# Patient Record
Sex: MALE | Race: WHITE | NOT HISPANIC OR LATINO
[De-identification: names, ages, dates, MRNs, and addresses within clinical notes are randomized per-mention and may not be internally consistent; named-entity substitution may affect disease eponyms.]

---

## 2018-03-08 ENCOUNTER — RESULT REVIEW (OUTPATIENT)
Age: 68
End: 2018-03-08

## 2018-11-16 ENCOUNTER — APPOINTMENT (OUTPATIENT)
Dept: ORTHOPEDIC SURGERY | Facility: CLINIC | Age: 68
End: 2018-11-16
Payer: MEDICARE

## 2018-11-16 VITALS
WEIGHT: 180 LBS | HEIGHT: 71 IN | BODY MASS INDEX: 25.2 KG/M2 | HEART RATE: 61 BPM | SYSTOLIC BLOOD PRESSURE: 111 MMHG | DIASTOLIC BLOOD PRESSURE: 68 MMHG

## 2018-11-16 PROCEDURE — 73110 X-RAY EXAM OF WRIST: CPT | Mod: RT

## 2018-11-16 PROCEDURE — 99203 OFFICE O/P NEW LOW 30 MIN: CPT

## 2018-12-13 ENCOUNTER — APPOINTMENT (OUTPATIENT)
Dept: ORTHOPEDIC SURGERY | Facility: CLINIC | Age: 68
End: 2018-12-13
Payer: MEDICARE

## 2018-12-13 DIAGNOSIS — M19.041 PRIMARY OSTEOARTHRITIS, RIGHT HAND: ICD-10-CM

## 2018-12-13 PROCEDURE — 99213 OFFICE O/P EST LOW 20 MIN: CPT

## 2020-08-20 PROBLEM — M19.041 PRIMARY LOCALIZED OSTEOARTHROSIS OF RIGHT HAND: Status: ACTIVE | Noted: 2018-11-16

## 2023-05-12 ENCOUNTER — NON-APPOINTMENT (OUTPATIENT)
Age: 73
End: 2023-05-12

## 2023-05-12 ENCOUNTER — APPOINTMENT (OUTPATIENT)
Dept: NEUROLOGY | Facility: CLINIC | Age: 73
End: 2023-05-12
Payer: MEDICARE

## 2023-05-12 VITALS
BODY MASS INDEX: 25.2 KG/M2 | WEIGHT: 180 LBS | SYSTOLIC BLOOD PRESSURE: 111 MMHG | HEART RATE: 62 BPM | HEIGHT: 71 IN | DIASTOLIC BLOOD PRESSURE: 73 MMHG

## 2023-05-12 DIAGNOSIS — G20 PARKINSON'S DISEASE: ICD-10-CM

## 2023-05-12 PROCEDURE — 99204 OFFICE O/P NEW MOD 45 MIN: CPT

## 2023-05-12 NOTE — DISCUSSION/SUMMARY
[FreeTextEntry1] : Patient with PD since 1996 who is stable on current LD regimen. He does not have motor fluctuations and dyskinesia are mild and not bothersome. \par \par Patient was counseled on the following recommendations:\par \par Will leave his current regimen unchanged.\par He should continue to remain active \par \par f/u 4-6 months

## 2023-05-12 NOTE — HISTORY OF PRESENT ILLNESS
[FreeTextEntry1] : 72 year RH male patient was diagnosed with Parkinson's disease in 1996. FIrst started as a right side tremor. Dr Cori Harrison was his most recent neurologist.  Denies issues with gait, no aid, no falls. Recalls starting carbidopa-levodopa 25/100 about 8 years ago. He notices it helps his tremor. He reports changes to his speech more garbled in the last few months. +dyskinetic neck movements in the last year. Unsure if he obtained genetic testing. Unaware of LD Kinetics. He is independent with ADLs, occasionally needs help with a button. \par \par Goes to the gym occasionally. No PT/OT\par \par Nonmotor:\par -Constipation, feels like he is not emptying bowels \par -Sleep is good no RBD \par -Denies difficulty swallowing\par -Denies cognitive changes\par -Denies VH \par -Mood improved since starting mirtazapine \par \par Medical hx: Prostate Ca on hormone therapy, HTN, HLD\par Family history: Younger sister has PD, mom had PD with hallucinations\par \par Current Medications:\par rasagaline 0.5mg qd\par mirtazapine 30mg qd\par carbidopa-levodopa 25/100 2-2-1 about q5h\par \par Prior meds:\par neupro\par amantadine- caused bad dreams\par

## 2023-05-12 NOTE — PHYSICAL EXAM
[Cranial Nerves Oculomotor (III)] : extraocular motion intact [Cranial Nerves Facial (VII)] : face symmetrical [Motor Strength] : muscle strength was normal in all four extremities [Sensation Tactile Decrease] : light touch was intact [1+] : Patella left 1+ [FreeTextEntry1] : There are mild dyskinesia of the head and neck. EOMI. Speech is 2+ and intelligble. There is no mild distal>prox bradykinesia with 1+ rigidity in his limbs. Walks with good SL and turns. Mild inversion of feet with each stride. Pull test negative. No FoG

## 2023-06-21 RX ORDER — MIRTAZAPINE 30 MG/1
30 TABLET, FILM COATED ORAL
Qty: 90 | Refills: 3 | Status: ACTIVE | COMMUNITY
Start: 2023-05-12 | End: 1900-01-01

## 2023-09-08 ENCOUNTER — APPOINTMENT (OUTPATIENT)
Dept: NEUROLOGY | Facility: CLINIC | Age: 73
End: 2023-09-08
Payer: MEDICARE

## 2023-09-08 PROCEDURE — 99214 OFFICE O/P EST MOD 30 MIN: CPT

## 2023-09-08 NOTE — HISTORY OF PRESENT ILLNESS
[FreeTextEntry1] : 72 year RH male patient was diagnosed with Parkinson's disease in 1996. FIrst started as a right side tremor. Dr Cori Harrison was his most recent neurologist.  Denies issues with gait, no aid, no falls. Recalls starting carbidopa-levodopa 25/100 about 8 years ago. He notices it helps his tremor. He reports changes to his speech more garbled in the last few months. +dyskinetic neck movements in the last year. Unsure if he obtained genetic testing. Unaware of LD Kinetics. He is independent with ADLs, occasionally needs help with a button.   Interim hx He reports some reduction in his speech with tendency to run out of breath. In the morning, his speech is close to normal.  He does not having wearing off episodes Denies any fall; wife says that patient is off balance when he has to turn or walk backwards He is independent to all ADLs Does not exercise or do PT Wife feels that pt is more carballo, which he denies. Does not endorse MDD or anxiety   Nonmotor: + constipation BMs every few days. He has tried a number of stool softners.  -Sleep is good no RBD  -Denies difficulty swallowing -Denies cognitive changes -Denies VH  -Mood improved since starting mirtazapine   Medical hx: Prostate Ca on hormone therapy, HTN, HLD Family history: Younger sister has PD, mom had PD with hallucinations  Current Medications: rasagaline 0.5mg qd mirtazapine 30mg qd carbidopa-levodopa 25/100 2-2-1 about q5h  Prior meds: neupro amantadine- caused bad dreams

## 2023-09-08 NOTE — PHYSICAL EXAM
[FreeTextEntry1] : There are mild dyskinesia of the head and neck. EOMI. Speech is 2+ and intelligble. There is no mild distal>prox bradykinesia with 1+ rigidity in his limbs. Walks with good SL and turns. Mild inversion of feet with each stride. Pull test negative. No FoG.  
none

## 2023-09-08 NOTE — DISCUSSION/SUMMARY
[FreeTextEntry1] : Patient with PD since 1996 who is stable on current LD regimen. He does not have motor fluctuations and dyskinesia are mild and not bothersome. Hypophonia has increased  Patient was counseled on the following recommendations: Will leave his current regimen unchanged. refer to ENT for evaluation of VCs Prefers to defer SLP at that this.  f/u GI doc regarding constipation management Increase exercising   f/u 4-6 months.

## 2023-12-13 ENCOUNTER — NON-APPOINTMENT (OUTPATIENT)
Age: 73
End: 2023-12-13

## 2024-01-02 ENCOUNTER — APPOINTMENT (OUTPATIENT)
Dept: NEUROLOGY | Facility: CLINIC | Age: 74
End: 2024-01-02
Payer: MEDICARE

## 2024-01-02 VITALS
SYSTOLIC BLOOD PRESSURE: 103 MMHG | BODY MASS INDEX: 27.72 KG/M2 | DIASTOLIC BLOOD PRESSURE: 68 MMHG | HEIGHT: 71 IN | WEIGHT: 198 LBS | HEART RATE: 73 BPM

## 2024-01-02 PROCEDURE — 99214 OFFICE O/P EST MOD 30 MIN: CPT

## 2024-01-04 NOTE — DISCUSSION/SUMMARY
[FreeTextEntry1] : Patient with PD since 1996 who is stable on current LD regimen. He does not have motor fluctuations and dyskinesia are mild and not bothersome  Patient was counseled on the following recommendations: leave PD regimen the same increase exercising maintain bowel regimen  f/u 4months

## 2024-01-04 NOTE — PHYSICAL EXAM
[FreeTextEntry1] : There are mild dyskinesia of the head and neck. EOMI. Speech is 2+ and intelligible. There is no mild distal>prox bradykinesia with 1+ rigidity in his limbs. Walks with good SL and turns. Mild inversion of feet with each stride. Pull test negative. No FoG

## 2024-01-04 NOTE — HISTORY OF PRESENT ILLNESS
[FreeTextEntry1] : 72 year RH male patient was diagnosed with Parkinson's disease in 1996. FIrst started as a right side tremor. Dr Cori Harrison was his most recent neurologist.  Denies issues with gait, no aid, no falls. Recalls starting carbidopa-levodopa 25/100 about 8 years ago. He notices it helps his tremor. He reports changes to his speech more garbled in the last few months. +dyskinetic neck movements in the last year. Unsure if he obtained genetic testing. Unaware of LD Kinetics. He is independent with ADLs, occasionally needs help with a button.   Interim hx He has been stable since his last visit does not have fluctuations.  Wife says that hypophonia worsens as the day progresses does not exercise much Denies any falls He is independent to all ADLs   Nonmotor: + constipation BMs every few days. He has tried a number of stool softners and not currently on anything -Sleep is good no RBD  -Denies difficulty swallowing -Denies cognitive changes -Denies VH  -Mood improved since starting mirtazapine   Medical hx: Prostate Ca on hormone therapy, HTN, HLD Family history: Younger sister has PD, mom had PD with hallucinations  Current Medications: rasagaline 0.5mg qd mirtazapine 30mg qhs carbidopa-levodopa 25/100 2-2-1 about q5h  Prior meds: neupro amantadine- caused bad dreams

## 2024-05-06 ENCOUNTER — APPOINTMENT (OUTPATIENT)
Dept: ORTHOPEDIC SURGERY | Facility: CLINIC | Age: 74
End: 2024-05-06
Payer: MEDICARE

## 2024-05-06 VITALS — WEIGHT: 198 LBS | HEIGHT: 71 IN | BODY MASS INDEX: 27.72 KG/M2

## 2024-05-06 DIAGNOSIS — M48.062 SPINAL STENOSIS, LUMBAR REGION WITH NEUROGENIC CLAUDICATION: ICD-10-CM

## 2024-05-06 PROCEDURE — 72170 X-RAY EXAM OF PELVIS: CPT

## 2024-05-06 PROCEDURE — 72110 X-RAY EXAM L-2 SPINE 4/>VWS: CPT

## 2024-05-06 PROCEDURE — 99204 OFFICE O/P NEW MOD 45 MIN: CPT

## 2024-05-06 NOTE — HISTORY OF PRESENT ILLNESS
[Gradual] : gradual [8] : 8 [0] : 0 [Dull/Aching] : dull/aching [Constant] : constant [de-identified] : 5/6/24:  74 yo M here for lower back pain - pain with activity - cant walk far   No prior spinal surgery No PT/chiro/accupucnture no use of cane brace at times   parkinson's/HTN prostate cancer -being tx with hormone tx now - had surgery and radiation as well  has bb incontinonce at times - not wearing diaper  retired   xrays today: L spine - diffuse spondylosis  AP PELVIS - HIP NARROWING  [] : no [FreeTextEntry5] : 05/06/2024 Mr. ANILA ARREODNDO is a 73 year M  presents today for evaluation of lower back pt states  he get pain when he stand or walk

## 2024-05-06 NOTE — DISCUSSION/SUMMARY
[Medication Risks Reviewed] : Medication risks reviewed [de-identified] : reviewed the case and hte imaging symptoms c/w stenosis indicated for mri l SPINE  PT  fu to review tHE mri

## 2024-05-07 ENCOUNTER — APPOINTMENT (OUTPATIENT)
Dept: MRI IMAGING | Facility: CLINIC | Age: 74
End: 2024-05-07
Payer: MEDICARE

## 2024-05-07 PROCEDURE — 72148 MRI LUMBAR SPINE W/O DYE: CPT | Mod: MH

## 2024-05-10 ENCOUNTER — APPOINTMENT (OUTPATIENT)
Dept: ORTHOPEDIC SURGERY | Facility: CLINIC | Age: 74
End: 2024-05-10
Payer: MEDICARE

## 2024-05-10 VITALS — HEIGHT: 71 IN | WEIGHT: 198 LBS | BODY MASS INDEX: 27.72 KG/M2

## 2024-05-10 PROCEDURE — 99214 OFFICE O/P EST MOD 30 MIN: CPT

## 2024-05-11 NOTE — HISTORY OF PRESENT ILLNESS
[Gradual] : gradual [8] : 8 [0] : 0 [Dull/Aching] : dull/aching [Constant] : constant [de-identified] : 5/6/24:  72 yo M here for lower back pain - pain with activity - cant walk far   No prior spinal surgery No PT/chiro/accupucnture no use of cane brace at times   parkinson's/HTN prostate cancer -being tx with hormone tx now - had surgery and radiation as well  has bb incontinonce at times - not wearing diaper  retired   xrays today: L spine - diffuse spondylosis  AP PELVIS - HIP NARROWING   05/10/2024: Here for follow up/ MRI review.  Plan at last was "reviewed the case and hte imaging symptoms c/w stenosis indicated for mri l SPINE  PT  fu to review tHE mri" - overall symptoms remains in the back/leg here to review tHE MRI   NO pt YET ACTIVITIES REMAIN LIMITED   Lspine MRI:  Prominent dextroscoliosis with rotary component and mildly accentuated sagittal lordosis.  Multilevel spondylosis and facet arthrosis. L5-S1 chronic degeneration and mild foraminal narrowing L4-5 central right central disc herniation. Marked right facet hypertrophy, moderate to severe left facet hypertrophy with ligamentous buckling and joint effusions. Moderate spinal and bilateral foraminal stenosis. L3-4 severe chronic spondylosis. Moderate left foraminal stenosis L2-3 severe chronic disc degeneration L1-2 Left central disc herniation   [] : no [FreeTextEntry5] : MRI lower back results today. continuing PT.

## 2024-05-11 NOTE — DISCUSSION/SUMMARY
[Medication Risks Reviewed] : Medication risks reviewed [Surgical risks reviewed] : Surgical risks reviewed [de-identified] : reviewed the case and the imaging with the patient discussion of tx optoins  referral to pain for LESI AS NEXT step  consider surgery if not responding would be lami/fusion -we discussed this today

## 2024-05-22 ENCOUNTER — RX RENEWAL (OUTPATIENT)
Age: 74
End: 2024-05-22

## 2024-05-22 RX ORDER — CARBIDOPA AND LEVODOPA 25; 100 MG/1; MG/1
25-100 TABLET ORAL
Qty: 450 | Refills: 1 | Status: ACTIVE | COMMUNITY
Start: 2023-05-12 | End: 1900-01-01

## 2024-05-22 RX ORDER — RASAGILINE 0.5 MG/1
0.5 TABLET ORAL DAILY
Qty: 90 | Refills: 1 | Status: ACTIVE | COMMUNITY
Start: 2023-05-12 | End: 1900-01-01

## 2024-05-24 ENCOUNTER — APPOINTMENT (OUTPATIENT)
Dept: NEUROLOGY | Facility: CLINIC | Age: 74
End: 2024-05-24
Payer: MEDICARE

## 2024-05-24 VITALS
WEIGHT: 200 LBS | SYSTOLIC BLOOD PRESSURE: 124 MMHG | DIASTOLIC BLOOD PRESSURE: 88 MMHG | HEIGHT: 71 IN | HEART RATE: 65 BPM | BODY MASS INDEX: 28 KG/M2

## 2024-05-24 PROCEDURE — 99213 OFFICE O/P EST LOW 20 MIN: CPT

## 2024-05-24 NOTE — HISTORY OF PRESENT ILLNESS
[FreeTextEntry1] : 72 year RH male patient was diagnosed with Parkinson's disease in 1996. FIrst started as a right side tremor. Dr Cori Harrison was his most recent neurologist.  Denies issues with gait, no aid, no falls. Recalls starting carbidopa-levodopa 25/100 about 8 years ago. He notices it helps his tremor. He reports changes to his speech more garbled in the last few months. +dyskinetic neck movements in the last year. Unsure if he obtained genetic testing. Unaware of LD Kinetics. He is independent with ADLs, occasionally needs help with a button.   Interim hx He has been stable since his last visit. Denies any wearing offs.  Denies any falls Does all ADLs independently.  Does not exercise regularly. Seeing ortho who will be giving him an SULEMAN soon for his LBP His gait is limited by the pain in his lumbar region. Recent MRI L/spine showed Mod stenosis at L3-5 with DJD   Nonmotor: + Has BM daily. Plans to see a new GI specialist -Sleep is good no RBD  -Denies difficulty swallowing -Denies cognitive changes -Denies VH  -Mood improved since starting mirtazapine   Medical hx: Prostate Ca on hormone therapy, HTN, HLD Family history: Younger sister has PD, mom had PD with hallucinations  Current Medications: rasagaline 0.5mg qd mirtazapine 30mg qhs carbidopa-levodopa 25/100 2-2-1 about q5h  Prior meds: neupro amantadine- caused bad dreams

## 2024-05-24 NOTE — DISCUSSION/SUMMARY
[FreeTextEntry1] :    Patient with PD since 1996 who is stable on current LD regimen. He does not have motor fluctuations and dyskinesia are mild and not bothersome. Lumbar stenosis affecting his gait currently.   Patient was counseled on the following recommendations: leave PD regimen the same increase exercising maintain bowel regimen and f/u with GI F/U Orthopedist for SULEMAN  f/u 4 months

## 2024-05-24 NOTE — PHYSICAL EXAM
[FreeTextEntry1] : There are mild dyskinesia of the head and neck. EOMI. Speech is 2+ and intelligible. There is no mild distal>prox bradykinesia L>RR with 1+ rigidity in his limbs. Walks with good SL and turns. Mild inversion of left foot with each stride. Pull test negative. No FoG.

## 2024-06-03 PROBLEM — M47.816 LUMBAR SPONDYLOSIS: Status: ACTIVE | Noted: 2024-05-06

## 2024-06-04 ENCOUNTER — APPOINTMENT (OUTPATIENT)
Dept: PAIN MANAGEMENT | Facility: CLINIC | Age: 74
End: 2024-06-04
Payer: MEDICARE

## 2024-06-04 VITALS — WEIGHT: 205 LBS | HEIGHT: 71 IN | BODY MASS INDEX: 28.7 KG/M2

## 2024-06-04 DIAGNOSIS — M47.816 SPONDYLOSIS W/OUT MYELOPATHY OR RADICULOPATHY, LUMBAR REGION: ICD-10-CM

## 2024-06-04 PROCEDURE — 99204 OFFICE O/P NEW MOD 45 MIN: CPT

## 2024-06-04 RX ORDER — IBUPROFEN 800 MG/1
800 TABLET, FILM COATED ORAL TWICE DAILY
Qty: 60 | Refills: 1 | Status: ACTIVE | COMMUNITY
Start: 2024-06-04 | End: 1900-01-01

## 2024-06-12 ENCOUNTER — NON-APPOINTMENT (OUTPATIENT)
Age: 74
End: 2024-06-12

## 2024-06-26 ENCOUNTER — APPOINTMENT (OUTPATIENT)
Dept: PAIN MANAGEMENT | Facility: CLINIC | Age: 74
End: 2024-06-26
Payer: MEDICARE

## 2024-06-26 DIAGNOSIS — M51.26 OTHER INTERVERTEBRAL DISC DISPLACEMENT, LUMBAR REGION: ICD-10-CM

## 2024-06-26 DIAGNOSIS — M54.16 RADICULOPATHY, LUMBAR REGION: ICD-10-CM

## 2024-06-26 PROCEDURE — 64483 NJX AA&/STRD TFRM EPI L/S 1: CPT | Mod: LT

## 2024-06-26 PROCEDURE — 64484 NJX AA&/STRD TFRM EPI L/S EA: CPT | Mod: LT

## 2024-06-26 NOTE — PHYSICAL EXAM
[de-identified] : Gen: NAD Head: NC/AT Eyes: no glasses, no scleral icterus ENT: mucous membranes moist CV: No JVD Lungs: nonlabored breathing Abd: soft, NT/ND Ext: full ROM in all extremities, no peripheral edema Back: +TTP in the left mid to upper lumbar region, +facet loading on the left, +SLR on the left Neuro: CN intact LEs +5 L +5 R hip flexion +5 L +5 R leg extension +5 L +5 R leg flexion +5 L +5 R foot dorsiflexion +5 L +5 R foot plantarflexion +5 L +5 R EHL extension Psych: normal affect Skin: no visible lesions

## 2024-06-26 NOTE — HISTORY OF PRESENT ILLNESS
[Lower back] : lower back [6] : 6 [0] : 0 [Burning] : burning [Sharp] : sharp [Shooting] : shooting [Constant] : constant [Household chores] : household chores [Leisure] : leisure [Sleep] : sleep [Meds] : meds [Physical therapy] : physical therapy [Standing] : standing [Walking] : walking [FreeTextEntry1] : 6/4/2024: ANILA ARREDONDO is a 73 year-old man presenting for a NPV for a history of chronic low back pain. The patient notes that he has had back pain for many years but notes that it has gotten significantly worse recently. At this point, the patient endorses an aching pain in the left low back with radiation to the left gluteal region. No focal numbness or weakness in the lower extremities. No bowel or bladder incontinence or saddle anesthesia. Pain is worse with standing and walking and generally improves with sitting.  The patient states that average pain over the past week was 5/10 in severity. Mood: Patient notes some depression and anxiety. Patient takes mirtazapine. Sleep: No difficulty with sleep 2/2 pain.     [] : no [de-identified] : x-ray and MRI 05/07/2024

## 2024-06-26 NOTE — DISCUSSION/SUMMARY
[de-identified] : ANILA ARREDONDO is a 73 year-old man presenting for a NPV for a history of chronic low back pain.   Prior treatment: Ibuprofen 800mg prn  Plan: 1) MRI lumbar spine images reviewed with the patient. 2) Schedule LEFT L4-L5, L5-S1 TFESI w/ MAC. The procedure was explained to the patient in detail. Reviewed risks, benefits, and alternatives with the patient. Some risks discussed included temporary increase in pain, bleeding, infection, and side effects from steroids. The patient expressed understanding and would like to proceed.  3) Initiate physical therapy - script provided 4) Refill ibuprofen 800mg prn; denies AEs 5) RTC post procedure

## 2024-06-26 NOTE — DATA REVIEWED
[MRI] : MRI [Lumbar Spine] : lumbar spine [Report was reviewed and noted in the chart] : The report was reviewed and noted in the chart [I independently reviewed and interpreted images and report] : I independently reviewed and interpreted images and report [FreeTextEntry1] : 5/7/2024 (MRI Lumbar O&C) Prominent dextroscoliosis associated w/ counterclockwise rotation of the lumbar vertebra w/ accentuated sagittal lordosis. Chronic superior endplate L3 and inferior endplate L1 Schmorl's nodes. Facet joints multilevel arthrosis.  L5-S1: moderate to severe chronic disc degenerative and circumferential bulging; mild facet hypertrophy and mild NF narrowing L4-L5: grade I L4 degenerative spondy; moderate to severe chronic disc degenerative; central/right subligamentous disc herniation indents the thecal sac on the RIGHT L5 nerve root; marked RIGHT facet hypertrophy with ligamentous buckling; moderate to severe LEFT facet hypertrophy; moderate bilateral NF stenosis L3-L4: mildly degenerative bulging disc, moderate facet hypertrophy; grade I degenerative retrolisthesis; devere disc degeneratiion and loss of disc height and signal w/ moderate LEFT NF stenosis

## 2024-07-12 ENCOUNTER — APPOINTMENT (OUTPATIENT)
Dept: PAIN MANAGEMENT | Facility: CLINIC | Age: 74
End: 2024-07-12
Payer: MEDICARE

## 2024-07-12 VITALS — BODY MASS INDEX: 28.7 KG/M2 | HEIGHT: 71 IN | WEIGHT: 205 LBS

## 2024-07-12 DIAGNOSIS — M47.816 SPONDYLOSIS W/OUT MYELOPATHY OR RADICULOPATHY, LUMBAR REGION: ICD-10-CM

## 2024-07-12 DIAGNOSIS — M51.26 OTHER INTERVERTEBRAL DISC DISPLACEMENT, LUMBAR REGION: ICD-10-CM

## 2024-07-12 DIAGNOSIS — M54.16 RADICULOPATHY, LUMBAR REGION: ICD-10-CM

## 2024-07-12 PROCEDURE — 99214 OFFICE O/P EST MOD 30 MIN: CPT

## 2024-08-07 ENCOUNTER — APPOINTMENT (OUTPATIENT)
Dept: PAIN MANAGEMENT | Facility: CLINIC | Age: 74
End: 2024-08-07

## 2024-08-07 PROCEDURE — 62323 NJX INTERLAMINAR LMBR/SAC: CPT

## 2024-08-07 NOTE — PROCEDURE
[FreeTextEntry1] : L5-S1 lumbar interlaminar epidural steroid injection [FreeTextEntry2] : chronic lumbar radiculopathy [FreeTextEntry3] : Procedure Date: 08/07/2024   Preoperative Diagnosis: chronic low back pain with bilateral sciatica   Procedure: L5-S1 epidural steroid injection under fluoroscopic guidance   Anesthesia: local   Complications: none   EBL: none   Procedure in detail: Patient was seen and examined. Risks, benefits, and alternatives for the procedure were discussed with the patient in detail. The patient expressed understanding, gave written and verbal consent, and placed themselves in a prone position on the procedure table. Skin overlying the lumbosacral spine was prepped with chloraprep and draped in the usual sterile fashion. Fluoroscopic images were obtained to identify the L5 and S1 vertebral body. Target was the interlaminar space between the L5 and S1 vertebral body. Skin overlying the target was marked and infiltrated with 1% lidocaine. Using an 20 gauge, 9cm Tuohy needle, this was inserted and advanced under intermittent fluoroscopic guidance. When felt to be engaged in the epidural space, lateral view was used to confirm depth. Needle was advanced until loss of resistance to saline was achieved. After negative aspiration for heme/csf, contrast was injected under live fluoroscopy, which showed contrast spread consistent with epidural flow. No evidence of intravascular or intrathecal uptake. At this point, a total of 3ml of injectate, which consisted of 1ml of 80mg/ml depomedrol and 2ml of 0.25% bupivacaine were injected. The needle was restyletted and withdrawn, a band aid was placed over the injection site. Patient tolerated the procedure well. The patient recovered uneventfully and was discharged home in stable condition.

## 2024-08-27 ENCOUNTER — APPOINTMENT (OUTPATIENT)
Dept: PAIN MANAGEMENT | Facility: CLINIC | Age: 74
End: 2024-08-27
Payer: MEDICARE

## 2024-08-27 VITALS — WEIGHT: 201 LBS | BODY MASS INDEX: 28.14 KG/M2 | HEIGHT: 71 IN

## 2024-08-27 DIAGNOSIS — M54.16 RADICULOPATHY, LUMBAR REGION: ICD-10-CM

## 2024-08-27 DIAGNOSIS — M51.26 OTHER INTERVERTEBRAL DISC DISPLACEMENT, LUMBAR REGION: ICD-10-CM

## 2024-08-27 DIAGNOSIS — M47.816 SPONDYLOSIS W/OUT MYELOPATHY OR RADICULOPATHY, LUMBAR REGION: ICD-10-CM

## 2024-08-27 PROCEDURE — 99214 OFFICE O/P EST MOD 30 MIN: CPT

## 2024-08-27 NOTE — DISCUSSION/SUMMARY
[de-identified] : ANILA ARREDONDO is a 73 year-old man presenting for a RPV for a history of chronic low back pain.   Prior treatment: 8/7/2024 L5-S1 ILESI w/o MAC w/ 80% improvement of pain and function 6/26/2024 LEFT L4-L5, L5-S1 TFESI w/ MAC w/ 70% improvement of pain and function  Ibuprofen 800mg prn  Plan: 1) MRI lumbar spine images reviewed with the patient. 2) Schedule LEFT L3, L4, L5 MBB w/o MAC x2. The procedure was explained to the patient in detail. Reviewed risks, benefits, and alternatives with the patient. Some risks discussed included temporary increase in pain, bleeding, infection, and side effects from steroids. The patient expressed understanding and would like to proceed.  3) Continue home exercise regimen 4) Continue ibuprofen 800mg prn; denies AEs 5) RTC post procedure

## 2024-08-27 NOTE — HISTORY OF PRESENT ILLNESS
[Lower back] : lower back [7] : 7 [2] : 2 [Burning] : burning [Dull/Aching] : dull/aching [Sharp] : sharp [Shooting] : shooting [Constant] : constant [Household chores] : household chores [Leisure] : leisure [Meds] : meds [Physical therapy] : physical therapy [Standing] : standing [Walking] : walking [4] : 4 [FreeTextEntry1] : 8/27/2024 ANILA ARREDONDO is a 74 year-old man presenting for a RPV for a history of chronic low back pain. 08/07/2024 L5-S1 ILESI w/o MAC with 70% relief pp with improved ROM and ADLS. States that he is still having some difficulty with walking due to pain, however not as severe. LESI approach with greater relief than TFESI.     The patient states that average pain over the past week was 2-3 /10 in severity.   Mood: Patient notes some depression and anxiety. Patient takes mirtazapine. Sleep: No difficulty with sleep 2/2 pain  07/12/2024 ANILA ARREDONDO is a 74 year-old man presenting for a RPV for a history of chronic low back pain. The patient underwent a LEFT L4-L5, L5-S1 TFESI w/ MAC on 6/26/2024. He notes having 70% improvement of pain since the procedure. He has also had some improvement of function and radicular symptoms. However, the patient notes that he still has difficulty standing for long periods.  The patient states that average pain over the past week was 2/10 in severity. Mood: Patient notes some depression and anxiety. Patient takes mirtazapine. Sleep: No difficulty with sleep 2/2 pain.   6/4/2024: ANILA ARREDONDO is a 73 year-old man presenting for a NPV for a history of chronic low back pain. The patient notes that he has had back pain for many years but notes that it has gotten significantly worse recently. At this point, the patient endorses an aching pain in the left low back with radiation to the left gluteal region. No focal numbness or weakness in the lower extremities. No bowel or bladder incontinence or saddle anesthesia. Pain is worse with standing and walking and generally improves with sitting.  The patient states that average pain over the past week was 5/10 in severity. Mood: Patient notes some depression and anxiety. Patient takes mirtazapine. Sleep: No difficulty with sleep 2/2 pain.     [] : no [de-identified] : x-ray and MRI 05/07/2024

## 2024-08-27 NOTE — DISCUSSION/SUMMARY
[de-identified] : ANILA ARREDONDO is a 73 year-old man presenting for a RPV for a history of chronic low back pain.   Prior treatment: 8/7/2024 L5-S1 ILESI w/o MAC w/ 80% improvement of pain and function 6/26/2024 LEFT L4-L5, L5-S1 TFESI w/ MAC w/ 70% improvement of pain and function  Ibuprofen 800mg prn  Plan: 1) MRI lumbar spine images reviewed with the patient. 2) Schedule LEFT L3, L4, L5 MBB w/o MAC x2. The procedure was explained to the patient in detail. Reviewed risks, benefits, and alternatives with the patient. Some risks discussed included temporary increase in pain, bleeding, infection, and side effects from steroids. The patient expressed understanding and would like to proceed.  3) Continue home exercise regimen 4) Continue ibuprofen 800mg prn; denies AEs 5) RTC post procedure

## 2024-08-27 NOTE — HISTORY OF PRESENT ILLNESS
[Lower back] : lower back [7] : 7 [2] : 2 [Burning] : burning [Dull/Aching] : dull/aching [Sharp] : sharp [Shooting] : shooting [Constant] : constant [Household chores] : household chores [Leisure] : leisure [Meds] : meds [Physical therapy] : physical therapy [Standing] : standing [Walking] : walking [4] : 4 [FreeTextEntry1] : 8/27/2024 ANIAL ARREDONDO is a 74 year-old man presenting for a RPV for a history of chronic low back pain. 08/07/2024 L5-S1 ILESI w/o MAC with 70% relief pp with improved ROM and ADLS. States that he is still having some difficulty with walking due to pain, however not as severe. LESI approach with greater relief than TFESI.     The patient states that average pain over the past week was 2-3 /10 in severity.   Mood: Patient notes some depression and anxiety. Patient takes mirtazapine. Sleep: No difficulty with sleep 2/2 pain  07/12/2024 ANILA ARREDONDO is a 74 year-old man presenting for a RPV for a history of chronic low back pain. The patient underwent a LEFT L4-L5, L5-S1 TFESI w/ MAC on 6/26/2024. He notes having 70% improvement of pain since the procedure. He has also had some improvement of function and radicular symptoms. However, the patient notes that he still has difficulty standing for long periods.  The patient states that average pain over the past week was 2/10 in severity. Mood: Patient notes some depression and anxiety. Patient takes mirtazapine. Sleep: No difficulty with sleep 2/2 pain.   6/4/2024: ANILA ARREDONDO is a 73 year-old man presenting for a NPV for a history of chronic low back pain. The patient notes that he has had back pain for many years but notes that it has gotten significantly worse recently. At this point, the patient endorses an aching pain in the left low back with radiation to the left gluteal region. No focal numbness or weakness in the lower extremities. No bowel or bladder incontinence or saddle anesthesia. Pain is worse with standing and walking and generally improves with sitting.  The patient states that average pain over the past week was 5/10 in severity. Mood: Patient notes some depression and anxiety. Patient takes mirtazapine. Sleep: No difficulty with sleep 2/2 pain.     [] : no [de-identified] : x-ray and MRI 05/07/2024

## 2024-08-27 NOTE — PHYSICAL EXAM
[de-identified] : Gen: NAD Head: NC/AT Eyes: no glasses, no scleral icterus ENT: mucous membranes moist CV: No JVD Lungs: nonlabored breathing Abd: soft, NT/ND Ext: full ROM in all extremities, no peripheral edema Back: +TTP in the left mid to upper lumbar region, +facet loading on the left, +SLR on the left Neuro: CN intact LEs +5 L +5 R hip flexion +5 L +5 R leg extension +5 L +5 R leg flexion +5 L +5 R foot dorsiflexion +5 L +5 R foot plantarflexion +5 L +5 R EHL extension Psych: normal affect Skin: no visible lesions

## 2024-08-27 NOTE — PHYSICAL EXAM
[de-identified] : Gen: NAD Head: NC/AT Eyes: no glasses, no scleral icterus ENT: mucous membranes moist CV: No JVD Lungs: nonlabored breathing Abd: soft, NT/ND Ext: full ROM in all extremities, no peripheral edema Back: +TTP in the left mid to upper lumbar region, +facet loading on the left, +SLR on the left Neuro: CN intact LEs +5 L +5 R hip flexion +5 L +5 R leg extension +5 L +5 R leg flexion +5 L +5 R foot dorsiflexion +5 L +5 R foot plantarflexion +5 L +5 R EHL extension Psych: normal affect Skin: no visible lesions

## 2024-09-06 ENCOUNTER — RX RENEWAL (OUTPATIENT)
Age: 74
End: 2024-09-06

## 2024-09-11 ENCOUNTER — APPOINTMENT (OUTPATIENT)
Dept: PAIN MANAGEMENT | Facility: CLINIC | Age: 74
End: 2024-09-11
Payer: MEDICARE

## 2024-09-11 DIAGNOSIS — M47.816 SPONDYLOSIS W/OUT MYELOPATHY OR RADICULOPATHY, LUMBAR REGION: ICD-10-CM

## 2024-09-11 PROCEDURE — 64494 INJ PARAVERT F JNT L/S 2 LEV: CPT | Mod: LT

## 2024-09-11 PROCEDURE — 64495 INJ PARAVERT F JNT L/S 3 LEV: CPT | Mod: LT

## 2024-09-11 PROCEDURE — 64493 INJ PARAVERT F JNT L/S 1 LEV: CPT | Mod: LT

## 2024-09-11 NOTE — PROCEDURE
[FreeTextEntry1] : LEFT L2, L3, L4, L5 medial branch block [FreeTextEntry2] : lumbar facet arthropathy [FreeTextEntry3] : Procedure Date: 09/11/2024   Preoperative Diagnosis: lumbar facet arthropathy   Procedure: LEFT L2, L3, L4 medial branch and L5 dorsal ramus diagnostic block under fluoroscopic guidance   Anesthesia: local   Complications: none   EBL: none   Procedure in detail: Patient was seen and examined. Risks, benefits, and alternatives for the procedure were discussed with the patient in detail. The patient expressed understanding, gave written and verbal consent, and placed themselves in a prone position on the procedure table. Skin overlying the lumbosacral spine was prepped with chloraprep and draped in the usual sterile fashion. Fluoroscopic images were obtained to identify the L4 and L5 vertebral bodies. Target was the junction of the superior articular process and the transverse process on the LEFT L3, L4 and L5 vertebral body as well as at the sacral ala. Skin overlying the targets was marked and infiltrated with 1% lidocaine. Using four 25 gauge, 3.5 inch spinal needles, these were inserted and advanced under intermittent fluoroscopic guidance. When felt to be make contact with os, lateral view was used to confirm depth. After negative aspiration for heme/csf, contrast was injected which showed contrast spread consistent with medial branch flow. No evidence of intravascular or intrathecal uptake. At this point, a total of 0.5ml of 0.5% bupivacaine and 0.2ml of 80mg/ml depomedrol was injected via each needle. The needles were restyletted and withdrawn, band aids were placed over the injection sites. Patient tolerated the procedure well. The patient recovered uneventfully and was discharged home in stable condition.

## 2024-09-25 ENCOUNTER — APPOINTMENT (OUTPATIENT)
Dept: PAIN MANAGEMENT | Facility: CLINIC | Age: 74
End: 2024-09-25

## 2024-09-25 DIAGNOSIS — M47.816 SPONDYLOSIS W/OUT MYELOPATHY OR RADICULOPATHY, LUMBAR REGION: ICD-10-CM

## 2024-09-25 PROCEDURE — 64495 INJ PARAVERT F JNT L/S 3 LEV: CPT | Mod: LT

## 2024-09-25 PROCEDURE — 64494 INJ PARAVERT F JNT L/S 2 LEV: CPT | Mod: LT

## 2024-09-25 PROCEDURE — 64493 INJ PARAVERT F JNT L/S 1 LEV: CPT | Mod: LT

## 2024-09-25 NOTE — PROCEDURE
[FreeTextEntry1] : LEFT L2, L3, L4, L5 medial branch block [FreeTextEntry2] : lumbar spondylosis [FreeTextEntry3] : Procedure Date: 09/25/2024   Preoperative Diagnosis: lumbar facet arthropathy   Procedure: LEFT L2, L3, L4 medial branch and L5 dorsal ramus diagnostic block under fluoroscopic guidance   Anesthesia: local   Complications: none   EBL: none   Procedure in detail: Patient was seen and examined. Risks, benefits, and alternatives for the procedure were discussed with the patient in detail. The patient expressed understanding, gave written and verbal consent, and placed themselves in a prone position on the procedure table. Skin overlying the lumbosacral spine was prepped with chloraprep and draped in the usual sterile fashion. Fluoroscopic images were obtained to identify the L4 and L5 vertebral bodies. Target was the junction of the superior articular process and the transverse process on the LEFT L3, L4 and L5 vertebral body as well as at the sacral ala. Skin overlying the targets was marked and infiltrated with 1% lidocaine. Using four 25 gauge, 3.5 inch spinal needles, these were inserted and advanced under intermittent fluoroscopic guidance. When felt to be make contact with os, lateral view was used to confirm depth. After negative aspiration for heme/csf, contrast was injected which showed contrast spread consistent with medial branch flow. No evidence of intravascular or intrathecal uptake. At this point, a total of 0.5ml of 0.5% bupivacaine and 0.2ml of 80mg/ml depomedrol was injected via each needle. The needles were restyletted and withdrawn, band aids were placed over the injection sites. Patient tolerated the procedure well. The patient recovered uneventfully and was discharged home in stable condition.

## 2024-10-11 ENCOUNTER — APPOINTMENT (OUTPATIENT)
Dept: PAIN MANAGEMENT | Facility: CLINIC | Age: 74
End: 2024-10-11
Payer: MEDICARE

## 2024-10-11 VITALS — BODY MASS INDEX: 28.84 KG/M2 | WEIGHT: 206 LBS | HEIGHT: 71 IN

## 2024-10-11 DIAGNOSIS — M47.816 SPONDYLOSIS W/OUT MYELOPATHY OR RADICULOPATHY, LUMBAR REGION: ICD-10-CM

## 2024-10-11 DIAGNOSIS — M54.16 RADICULOPATHY, LUMBAR REGION: ICD-10-CM

## 2024-10-11 DIAGNOSIS — M51.26 OTHER INTERVERTEBRAL DISC DISPLACEMENT, LUMBAR REGION: ICD-10-CM

## 2024-10-11 PROCEDURE — 99214 OFFICE O/P EST MOD 30 MIN: CPT

## 2024-11-19 ENCOUNTER — RX RENEWAL (OUTPATIENT)
Age: 74
End: 2024-11-19

## 2024-11-27 ENCOUNTER — APPOINTMENT (OUTPATIENT)
Dept: PAIN MANAGEMENT | Facility: CLINIC | Age: 74
End: 2024-11-27

## 2024-11-27 PROCEDURE — 64636Z: CUSTOM | Mod: LT

## 2024-11-27 PROCEDURE — 64635 DESTROY LUMB/SAC FACET JNT: CPT | Mod: LT

## 2024-12-04 ENCOUNTER — APPOINTMENT (OUTPATIENT)
Dept: PAIN MANAGEMENT | Facility: CLINIC | Age: 74
End: 2024-12-04
Payer: MEDICARE

## 2024-12-04 VITALS — HEIGHT: 71 IN | BODY MASS INDEX: 28 KG/M2 | WEIGHT: 200 LBS

## 2024-12-04 DIAGNOSIS — M48.062 SPINAL STENOSIS, LUMBAR REGION WITH NEUROGENIC CLAUDICATION: ICD-10-CM

## 2024-12-04 DIAGNOSIS — G20.A1 PARKINSON'S DISEASE WITHOUT DYSKINESIA, WITHOUT MENTION OF FLUCTUATIONS: ICD-10-CM

## 2024-12-04 DIAGNOSIS — M47.816 SPONDYLOSIS W/OUT MYELOPATHY OR RADICULOPATHY, LUMBAR REGION: ICD-10-CM

## 2024-12-04 PROCEDURE — 99213 OFFICE O/P EST LOW 20 MIN: CPT

## 2024-12-06 ENCOUNTER — RX RENEWAL (OUTPATIENT)
Age: 74
End: 2024-12-06

## 2024-12-09 ENCOUNTER — OFFICE (OUTPATIENT)
Dept: URBAN - METROPOLITAN AREA CLINIC 12 | Facility: CLINIC | Age: 74
Setting detail: OPHTHALMOLOGY
End: 2024-12-09
Payer: MEDICARE

## 2024-12-09 DIAGNOSIS — H26.493: ICD-10-CM

## 2024-12-09 DIAGNOSIS — H02.403: ICD-10-CM

## 2024-12-09 DIAGNOSIS — H35.3131: ICD-10-CM

## 2024-12-09 PROBLEM — Z96.1 PSEUDOPHAKIA; BOTH EYES: Status: ACTIVE | Noted: 2024-12-09

## 2024-12-09 PROCEDURE — 92250 FUNDUS PHOTOGRAPHY W/I&R: CPT | Performed by: OPHTHALMOLOGY

## 2024-12-09 PROCEDURE — 92004 COMPRE OPH EXAM NEW PT 1/>: CPT | Performed by: OPHTHALMOLOGY

## 2024-12-09 ASSESSMENT — TONOMETRY
OD_IOP_MMHG: 11
OS_IOP_MMHG: 12

## 2024-12-09 ASSESSMENT — CONFRONTATIONAL VISUAL FIELD TEST (CVF)
OD_FINDINGS: FULL
OS_FINDINGS: FULL

## 2024-12-11 ASSESSMENT — KERATOMETRY
OD_AXISANGLE_DEGREES: 095
OS_K1POWER_DIOPTERS: 45.25
OS_AXISANGLE_DEGREES: 108
OS_K2POWER_DIOPTERS: 45.75
OD_K1POWER_DIOPTERS: 43.50
OD_K2POWER_DIOPTERS: 44.75

## 2024-12-11 ASSESSMENT — REFRACTION_MANIFEST
OD_VA1: 20/20-
OD_AXIS: 020
OD_CYLINDER: -1.00
OS_SPHERE: PL
OD_SPHERE: -1.00
OS_CYLINDER: -1.00
OS_AXIS: 080
OS_VA1: 20/20-2

## 2024-12-11 ASSESSMENT — REFRACTION_AUTOREFRACTION
OD_CYLINDER: -1.25
OD_AXIS: 017
OS_AXIS: 081
OS_SPHERE: PL
OD_SPHERE: -1.00
OS_CYLINDER: -0.50

## 2024-12-11 ASSESSMENT — VISUAL ACUITY
OS_BCVA: 20/40+2
OD_BCVA: 20/25

## 2025-01-14 ENCOUNTER — RX RENEWAL (OUTPATIENT)
Age: 75
End: 2025-01-14

## 2025-02-18 ENCOUNTER — RX RENEWAL (OUTPATIENT)
Age: 75
End: 2025-02-18

## 2025-02-25 ENCOUNTER — APPOINTMENT (OUTPATIENT)
Dept: NEUROLOGY | Facility: CLINIC | Age: 75
End: 2025-02-25
Payer: MEDICARE

## 2025-02-25 VITALS
HEIGHT: 71 IN | SYSTOLIC BLOOD PRESSURE: 123 MMHG | WEIGHT: 197 LBS | BODY MASS INDEX: 27.58 KG/M2 | DIASTOLIC BLOOD PRESSURE: 81 MMHG | HEART RATE: 77 BPM

## 2025-02-25 DIAGNOSIS — R13.10 DYSPHAGIA, UNSPECIFIED: ICD-10-CM

## 2025-02-25 PROCEDURE — 99214 OFFICE O/P EST MOD 30 MIN: CPT

## 2025-03-06 ENCOUNTER — RX RENEWAL (OUTPATIENT)
Age: 75
End: 2025-03-06

## 2025-04-21 ENCOUNTER — APPOINTMENT (OUTPATIENT)
Dept: PAIN MANAGEMENT | Facility: CLINIC | Age: 75
End: 2025-04-21

## 2025-04-21 VITALS — HEIGHT: 71 IN | WEIGHT: 201 LBS | BODY MASS INDEX: 28.14 KG/M2

## 2025-04-21 DIAGNOSIS — M48.062 SPINAL STENOSIS, LUMBAR REGION WITH NEUROGENIC CLAUDICATION: ICD-10-CM

## 2025-04-21 DIAGNOSIS — M47.816 SPONDYLOSIS W/OUT MYELOPATHY OR RADICULOPATHY, LUMBAR REGION: ICD-10-CM

## 2025-04-21 PROCEDURE — 99214 OFFICE O/P EST MOD 30 MIN: CPT | Mod: 25

## 2025-04-21 PROCEDURE — 20552 NJX 1/MLT TRIGGER POINT 1/2: CPT

## 2025-04-30 ENCOUNTER — APPOINTMENT (OUTPATIENT)
Dept: PAIN MANAGEMENT | Facility: CLINIC | Age: 75
End: 2025-04-30
Payer: MEDICARE

## 2025-04-30 DIAGNOSIS — M51.26 OTHER INTERVERTEBRAL DISC DISPLACEMENT, LUMBAR REGION: ICD-10-CM

## 2025-04-30 DIAGNOSIS — M54.16 RADICULOPATHY, LUMBAR REGION: ICD-10-CM

## 2025-04-30 PROCEDURE — 62323 NJX INTERLAMINAR LMBR/SAC: CPT

## 2025-05-28 ENCOUNTER — APPOINTMENT (OUTPATIENT)
Dept: PAIN MANAGEMENT | Facility: CLINIC | Age: 75
End: 2025-05-28
Payer: MEDICARE

## 2025-05-28 VITALS — BODY MASS INDEX: 27.02 KG/M2 | HEIGHT: 71 IN | WEIGHT: 193 LBS

## 2025-05-28 DIAGNOSIS — M48.062 SPINAL STENOSIS, LUMBAR REGION WITH NEUROGENIC CLAUDICATION: ICD-10-CM

## 2025-05-28 PROCEDURE — 99214 OFFICE O/P EST MOD 30 MIN: CPT

## 2025-06-05 ENCOUNTER — APPOINTMENT (OUTPATIENT)
Dept: PAIN MANAGEMENT | Facility: CLINIC | Age: 75
End: 2025-06-05
Payer: MEDICARE

## 2025-06-05 DIAGNOSIS — M47.816 SPONDYLOSIS W/OUT MYELOPATHY OR RADICULOPATHY, LUMBAR REGION: ICD-10-CM

## 2025-06-05 PROCEDURE — 64636Z: CUSTOM | Mod: LT,59

## 2025-06-05 PROCEDURE — 64635 DESTROY LUMB/SAC FACET JNT: CPT | Mod: LT

## 2025-06-11 ENCOUNTER — OFFICE (OUTPATIENT)
Dept: URBAN - METROPOLITAN AREA CLINIC 12 | Facility: CLINIC | Age: 75
Setting detail: OPHTHALMOLOGY
End: 2025-06-11
Payer: MEDICARE

## 2025-06-11 DIAGNOSIS — H35.3131: ICD-10-CM

## 2025-06-11 DIAGNOSIS — H26.493: ICD-10-CM

## 2025-06-11 DIAGNOSIS — H02.403: ICD-10-CM

## 2025-06-11 DIAGNOSIS — Z96.1: ICD-10-CM

## 2025-06-11 PROCEDURE — 92134 CPTRZ OPH DX IMG PST SGM RTA: CPT | Performed by: OPHTHALMOLOGY

## 2025-06-11 PROCEDURE — 92014 COMPRE OPH EXAM EST PT 1/>: CPT | Performed by: OPHTHALMOLOGY

## 2025-06-11 ASSESSMENT — REFRACTION_MANIFEST
OS_SPHERE: PL
OS_VA1: 20/20-2
OD_SPHERE: -1.00
OD_VA1: 20/20
OD_CYLINDER: -1.00
OS_AXIS: 030
OD_AXIS: 25
OD_AXIS: 020
OS_CYLINDER: -0.75
OS_CYLINDER: -1.00
OD_CYLINDER: -1.00
OD_SPHERE: PL
OS_SPHERE: -0.50
OD_VA1: 20/20-
OS_VA1: 20/25-
OS_AXIS: 080

## 2025-06-11 ASSESSMENT — KERATOMETRY
OS_K1POWER_DIOPTERS: 45.25
OD_AXISANGLE_DEGREES: 104
OS_AXISANGLE_DEGREES: 102
OD_K1POWER_DIOPTERS: 44.00
OS_K2POWER_DIOPTERS: 45.50
OD_K2POWER_DIOPTERS: 45.25

## 2025-06-11 ASSESSMENT — REFRACTION_AUTOREFRACTION
OD_AXIS: 25
OS_SPHERE: -0.50
OD_CYLINDER: -1.00
OS_CYLINDER: -0.75
OS_AXIS: 031
OD_SPHERE: PL

## 2025-06-11 ASSESSMENT — TONOMETRY
OS_IOP_MMHG: 10
OD_IOP_MMHG: 10

## 2025-06-11 ASSESSMENT — VISUAL ACUITY
OS_BCVA: 20/25-2
OD_BCVA: 20/25-

## 2025-06-11 ASSESSMENT — CONFRONTATIONAL VISUAL FIELD TEST (CVF)
OS_FINDINGS: FULL
OD_FINDINGS: FULL

## 2025-06-16 ENCOUNTER — RX RENEWAL (OUTPATIENT)
Age: 75
End: 2025-06-16

## 2025-06-20 ENCOUNTER — APPOINTMENT (OUTPATIENT)
Dept: PAIN MANAGEMENT | Facility: CLINIC | Age: 75
End: 2025-06-20
Payer: MEDICARE

## 2025-06-20 VITALS — HEIGHT: 71 IN | WEIGHT: 193 LBS | BODY MASS INDEX: 27.02 KG/M2

## 2025-06-20 PROCEDURE — 99214 OFFICE O/P EST MOD 30 MIN: CPT

## 2025-06-30 ENCOUNTER — OFFICE (OUTPATIENT)
Dept: URBAN - METROPOLITAN AREA CLINIC 100 | Facility: CLINIC | Age: 75
Setting detail: OPHTHALMOLOGY
End: 2025-06-30
Payer: MEDICARE

## 2025-06-30 DIAGNOSIS — H53.40: ICD-10-CM

## 2025-06-30 DIAGNOSIS — H02.831: ICD-10-CM

## 2025-06-30 DIAGNOSIS — H02.834: ICD-10-CM

## 2025-06-30 DIAGNOSIS — H02.403: ICD-10-CM

## 2025-06-30 DIAGNOSIS — H57.813: ICD-10-CM

## 2025-06-30 PROCEDURE — 92285 EXTERNAL OCULAR PHOTOGRAPHY: CPT | Performed by: OPHTHALMOLOGY

## 2025-06-30 PROCEDURE — 92081 LIMITED VISUAL FIELD XM: CPT | Performed by: OPHTHALMOLOGY

## 2025-06-30 PROCEDURE — 92014 COMPRE OPH EXAM EST PT 1/>: CPT | Performed by: OPHTHALMOLOGY

## 2025-06-30 ASSESSMENT — CONFRONTATIONAL VISUAL FIELD TEST (CVF)
OS_FINDINGS: FULL
OD_FINDINGS: FULL

## 2025-06-30 ASSESSMENT — VISUAL ACUITY
OD_BCVA: 20/20
OS_BCVA: 20/25-2

## 2025-07-22 ENCOUNTER — RX RENEWAL (OUTPATIENT)
Age: 75
End: 2025-07-22

## 2025-07-31 ENCOUNTER — APPOINTMENT (OUTPATIENT)
Dept: PAIN MANAGEMENT | Facility: CLINIC | Age: 75
End: 2025-07-31
Payer: MEDICARE

## 2025-07-31 PROCEDURE — 64483 NJX AA&/STRD TFRM EPI L/S 1: CPT | Mod: LT

## 2025-07-31 PROCEDURE — 64484 NJX AA&/STRD TFRM EPI L/S EA: CPT | Mod: LT,59

## 2025-08-13 ENCOUNTER — APPOINTMENT (OUTPATIENT)
Dept: PAIN MANAGEMENT | Facility: CLINIC | Age: 75
End: 2025-08-13
Payer: MEDICARE

## 2025-08-13 VITALS — BODY MASS INDEX: 27.58 KG/M2 | WEIGHT: 197 LBS | HEIGHT: 71 IN

## 2025-08-13 DIAGNOSIS — M54.16 RADICULOPATHY, LUMBAR REGION: ICD-10-CM

## 2025-08-13 DIAGNOSIS — M47.816 SPONDYLOSIS W/OUT MYELOPATHY OR RADICULOPATHY, LUMBAR REGION: ICD-10-CM

## 2025-08-13 DIAGNOSIS — M51.26 OTHER INTERVERTEBRAL DISC DISPLACEMENT, LUMBAR REGION: ICD-10-CM

## 2025-08-13 PROCEDURE — 99214 OFFICE O/P EST MOD 30 MIN: CPT

## 2025-08-19 ENCOUNTER — APPOINTMENT (OUTPATIENT)
Dept: PAIN MANAGEMENT | Facility: CLINIC | Age: 75
End: 2025-08-19

## 2025-08-21 ENCOUNTER — APPOINTMENT (OUTPATIENT)
Dept: NEUROLOGY | Facility: CLINIC | Age: 75
End: 2025-08-21
Payer: MEDICARE

## 2025-08-21 ENCOUNTER — RX RENEWAL (OUTPATIENT)
Age: 75
End: 2025-08-21

## 2025-08-21 VITALS
DIASTOLIC BLOOD PRESSURE: 78 MMHG | BODY MASS INDEX: 26.88 KG/M2 | HEIGHT: 71 IN | SYSTOLIC BLOOD PRESSURE: 118 MMHG | WEIGHT: 192 LBS | HEART RATE: 76 BPM

## 2025-08-21 PROCEDURE — 99214 OFFICE O/P EST MOD 30 MIN: CPT
